# Patient Record
Sex: MALE | Race: BLACK OR AFRICAN AMERICAN | NOT HISPANIC OR LATINO | Employment: STUDENT | URBAN - METROPOLITAN AREA
[De-identification: names, ages, dates, MRNs, and addresses within clinical notes are randomized per-mention and may not be internally consistent; named-entity substitution may affect disease eponyms.]

---
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
## 2025-05-15 ENCOUNTER — TELEPHONE (OUTPATIENT)
Dept: FAMILY MEDICINE | Facility: CLINIC | Age: 25
End: 2025-05-15
Payer: COMMERCIAL

## 2025-05-22 ENCOUNTER — OFFICE VISIT (OUTPATIENT)
Dept: FAMILY MEDICINE | Facility: CLINIC | Age: 25
End: 2025-05-22
Payer: COMMERCIAL

## 2025-05-22 VITALS
OXYGEN SATURATION: 99 % | HEART RATE: 67 BPM | WEIGHT: 197.31 LBS | DIASTOLIC BLOOD PRESSURE: 77 MMHG | SYSTOLIC BLOOD PRESSURE: 133 MMHG | TEMPERATURE: 99 F | HEIGHT: 71 IN | BODY MASS INDEX: 27.62 KG/M2

## 2025-05-22 DIAGNOSIS — Z00.00 PREVENTATIVE HEALTH CARE: Primary | ICD-10-CM

## 2025-05-22 DIAGNOSIS — S76.212A INGUINAL STRAIN, LEFT, INITIAL ENCOUNTER: ICD-10-CM

## 2025-05-22 PROCEDURE — 99999 PR PBB SHADOW E&M-EST. PATIENT-LVL III: CPT | Mod: PBBFAC,,, | Performed by: FAMILY MEDICINE

## 2025-05-22 NOTE — PROGRESS NOTES
Subjective:       Patient ID: Phani Grande is a 25 y.o. male.    Chief Complaint: Annual Exam      Patient is here to get established he is getting ready to start school in [place] and wanted to get checked up and get  blood work  Lab Results       Component                Value               Date                       WBC                      4.56                07/05/2019                 HGB                      12.7 (L)            07/05/2019                 HCT                      42.0                07/05/2019                 PLT                      240                 07/05/2019                 CHOL                     168                 07/05/2019                 TRIG                     80                  07/05/2019                 HDL                      62                  07/05/2019                 ALT                      18                  07/05/2019                 AST                      17                  07/05/2019                 NA                       138                 07/05/2019                 K                        4.4                 07/05/2019                 CL                       102                 07/05/2019                 CREATININE               1.2                 07/05/2019                 BUN                      16                  07/05/2019                 CO2                      28                  07/05/2019                    Allergies and Medications:   Review of patient's allergies indicates:   Allergen Reactions    Shellfish containing products Itching and Swelling     Allergic to seafood    Tree nut Itching and Swelling     Allergic to all nuts     Current Medications[1]    Family History:   Family History   Problem Relation Name Age of Onset    Multiple sclerosis Maternal Grandmother      Asthma Paternal Grandmother      Diabetes Paternal Grandfather          DM2    Diabetes Other Mat Dz     Cancer Other Mat Dz     Multiple sclerosis Other Mat Dz         Social History:   Social History[2]    Review of Systems    Objective:     Vitals:    05/22/25 0851   BP: 133/77   Pulse: 67   Temp: 99 °F (37.2 °C)        Physical Exam    Assessment:       1. Preventative health care    2. Inguinal strain, left, initial encounter        Plan:       Phani was seen today for annual exam.    Diagnoses and all orders for this visit:    Preventative health care  -     Lipid Panel; Future  -     CBC Auto Differential; Future  -     Comprehensive Metabolic Panel; Future  -     Hepatitis C Antibody; Future  -     HIV 1/2 Ag/Ab (4th Gen); Future  -     C. trachomatis/N. gonorrhoeae by AMP DNA; Future    Inguinal strain, left, initial encounter         Follow up in about 1 year (around 5/22/2026).         [1]   No current outpatient medications on file.     No current facility-administered medications for this visit.   [2]   Social History  Socioeconomic History    Marital status: Single   Tobacco Use    Smoking status: Passive Smoke Exposure - Never Smoker    Smokeless tobacco: Never   Substance and Sexual Activity    Alcohol use: No    Drug use: No    Sexual activity: Never   Social History Narrative    SOC.HX: Intact family. Lives with Mom, Dad, PGF, and 1 sibling in [place]. Mom & Dad have family in the area. Limited support from Mom's & Dad's families.     Mom works as .     Dad works as a  at [employer].     Other Caregivers: NONE.     + smoker -- PGF, outside.     + pets -- 1 dog, outside during days; inside at night on chain.    LEAD & TB/HIV RISK: Lead -- negative. TB/HIV -- negative.